# Patient Record
Sex: FEMALE | Race: WHITE | NOT HISPANIC OR LATINO | URBAN - METROPOLITAN AREA
[De-identification: names, ages, dates, MRNs, and addresses within clinical notes are randomized per-mention and may not be internally consistent; named-entity substitution may affect disease eponyms.]

---

## 2018-06-26 ENCOUNTER — EMERGENCY (EMERGENCY)
Facility: HOSPITAL | Age: 33
LOS: 1 days | Discharge: HOME | End: 2018-06-26
Attending: EMERGENCY MEDICINE | Admitting: EMERGENCY MEDICINE
Payer: COMMERCIAL

## 2018-06-26 VITALS
SYSTOLIC BLOOD PRESSURE: 102 MMHG | DIASTOLIC BLOOD PRESSURE: 60 MMHG | RESPIRATION RATE: 16 BRPM | TEMPERATURE: 98 F | HEART RATE: 66 BPM | OXYGEN SATURATION: 100 %

## 2018-06-26 VITALS
HEART RATE: 62 BPM | DIASTOLIC BLOOD PRESSURE: 78 MMHG | SYSTOLIC BLOOD PRESSURE: 113 MMHG | WEIGHT: 139.99 LBS | OXYGEN SATURATION: 100 % | RESPIRATION RATE: 16 BRPM | TEMPERATURE: 97 F

## 2018-06-26 DIAGNOSIS — R55 SYNCOPE AND COLLAPSE: ICD-10-CM

## 2018-06-26 DIAGNOSIS — E87.5 HYPERKALEMIA: ICD-10-CM

## 2018-06-26 DIAGNOSIS — E87.1 HYPO-OSMOLALITY AND HYPONATREMIA: ICD-10-CM

## 2018-06-26 LAB
ALBUMIN SERPL ELPH-MCNC: 3.3 G/DL — LOW (ref 3.4–5)
ALBUMIN SERPL ELPH-MCNC: 4 G/DL — SIGNIFICANT CHANGE UP (ref 3.4–5)
ALP SERPL-CCNC: 44 U/L — SIGNIFICANT CHANGE UP (ref 40–120)
ALP SERPL-CCNC: 52 U/L — SIGNIFICANT CHANGE UP (ref 40–120)
ALT FLD-CCNC: 20 U/L — SIGNIFICANT CHANGE UP (ref 12–42)
ALT FLD-CCNC: 23 U/L — SIGNIFICANT CHANGE UP (ref 12–42)
ANION GAP SERPL CALC-SCNC: 2 MMOL/L — LOW (ref 9–16)
ANION GAP SERPL CALC-SCNC: 8 MMOL/L — LOW (ref 9–16)
APPEARANCE UR: CLEAR — SIGNIFICANT CHANGE UP
AST SERPL-CCNC: 31 U/L — SIGNIFICANT CHANGE UP (ref 15–37)
AST SERPL-CCNC: 38 U/L — HIGH (ref 15–37)
BILIRUB SERPL-MCNC: 0.5 MG/DL — SIGNIFICANT CHANGE UP (ref 0.2–1.2)
BILIRUB SERPL-MCNC: 0.7 MG/DL — SIGNIFICANT CHANGE UP (ref 0.2–1.2)
BILIRUB UR-MCNC: NEGATIVE — SIGNIFICANT CHANGE UP
BUN SERPL-MCNC: 10 MG/DL — SIGNIFICANT CHANGE UP (ref 7–23)
BUN SERPL-MCNC: 14 MG/DL — SIGNIFICANT CHANGE UP (ref 7–23)
CALCIUM SERPL-MCNC: 7.8 MG/DL — LOW (ref 8.5–10.5)
CALCIUM SERPL-MCNC: 9.1 MG/DL — SIGNIFICANT CHANGE UP (ref 8.5–10.5)
CHLORIDE SERPL-SCNC: 103 MMOL/L — SIGNIFICANT CHANGE UP (ref 96–108)
CHLORIDE SERPL-SCNC: 109 MMOL/L — HIGH (ref 96–108)
CO2 SERPL-SCNC: 22 MMOL/L — SIGNIFICANT CHANGE UP (ref 22–31)
CO2 SERPL-SCNC: 23 MMOL/L — SIGNIFICANT CHANGE UP (ref 22–31)
COLOR SPEC: YELLOW — SIGNIFICANT CHANGE UP
CREAT SERPL-MCNC: 0.62 MG/DL — SIGNIFICANT CHANGE UP (ref 0.5–1.3)
CREAT SERPL-MCNC: 0.64 MG/DL — SIGNIFICANT CHANGE UP (ref 0.5–1.3)
DIFF PNL FLD: ABNORMAL
GLUCOSE SERPL-MCNC: 107 MG/DL — HIGH (ref 70–99)
GLUCOSE SERPL-MCNC: 99 MG/DL — SIGNIFICANT CHANGE UP (ref 70–99)
GLUCOSE UR QL: NEGATIVE — SIGNIFICANT CHANGE UP
HCG UR QL: NEGATIVE — SIGNIFICANT CHANGE UP
HCT VFR BLD CALC: 35.1 % — SIGNIFICANT CHANGE UP (ref 34.5–45)
HCT VFR BLD CALC: 41.4 % — SIGNIFICANT CHANGE UP (ref 34.5–45)
HGB BLD-MCNC: 12 G/DL — SIGNIFICANT CHANGE UP (ref 11.5–15.5)
HGB BLD-MCNC: 14.2 G/DL — SIGNIFICANT CHANGE UP (ref 11.5–15.5)
KETONES UR-MCNC: 15 MG/DL
LACTATE SERPL-SCNC: 1.8 MMOL/L — SIGNIFICANT CHANGE UP (ref 0.4–2)
LEUKOCYTE ESTERASE UR-ACNC: ABNORMAL
MAGNESIUM SERPL-MCNC: 2 MG/DL — SIGNIFICANT CHANGE UP (ref 1.6–2.6)
MCHC RBC-ENTMCNC: 32.5 PG — SIGNIFICANT CHANGE UP (ref 27–34)
MCHC RBC-ENTMCNC: 32.5 PG — SIGNIFICANT CHANGE UP (ref 27–34)
MCHC RBC-ENTMCNC: 34.2 G/DL — SIGNIFICANT CHANGE UP (ref 32–36)
MCHC RBC-ENTMCNC: 34.3 G/DL — SIGNIFICANT CHANGE UP (ref 32–36)
MCV RBC AUTO: 94.7 FL — SIGNIFICANT CHANGE UP (ref 80–100)
MCV RBC AUTO: 95.1 FL — SIGNIFICANT CHANGE UP (ref 80–100)
NITRITE UR-MCNC: NEGATIVE — SIGNIFICANT CHANGE UP
PH UR: 7.5 — SIGNIFICANT CHANGE UP (ref 5–8)
PLATELET # BLD AUTO: 292 K/UL — SIGNIFICANT CHANGE UP (ref 150–400)
PLATELET # BLD AUTO: 319 K/UL — SIGNIFICANT CHANGE UP (ref 150–400)
POTASSIUM SERPL-MCNC: 4.6 MMOL/L — SIGNIFICANT CHANGE UP (ref 3.5–5.3)
POTASSIUM SERPL-MCNC: 5.6 MMOL/L — HIGH (ref 3.5–5.3)
POTASSIUM SERPL-SCNC: 4.6 MMOL/L — SIGNIFICANT CHANGE UP (ref 3.5–5.3)
POTASSIUM SERPL-SCNC: 5.6 MMOL/L — HIGH (ref 3.5–5.3)
PROT SERPL-MCNC: 6.8 G/DL — SIGNIFICANT CHANGE UP (ref 6.4–8.2)
PROT SERPL-MCNC: 8.6 G/DL — HIGH (ref 6.4–8.2)
PROT UR-MCNC: NEGATIVE MG/DL — SIGNIFICANT CHANGE UP
RBC # BLD: 3.69 M/UL — LOW (ref 3.8–5.2)
RBC # BLD: 4.37 M/UL — SIGNIFICANT CHANGE UP (ref 3.8–5.2)
RBC # FLD: 11.8 % — SIGNIFICANT CHANGE UP (ref 10.3–16.9)
RBC # FLD: 11.8 % — SIGNIFICANT CHANGE UP (ref 10.3–16.9)
SODIUM SERPL-SCNC: 128 MMOL/L — LOW (ref 132–145)
SODIUM SERPL-SCNC: 139 MMOL/L — SIGNIFICANT CHANGE UP (ref 132–145)
SP GR SPEC: 1.01 — SIGNIFICANT CHANGE UP (ref 1–1.03)
TROPONIN I SERPL-MCNC: <0.017 NG/ML — LOW (ref 0.02–0.06)
UROBILINOGEN FLD QL: 0.2 E.U./DL — SIGNIFICANT CHANGE UP
WBC # BLD: 13.5 K/UL — HIGH (ref 3.8–10.5)
WBC # BLD: 18.5 K/UL — HIGH (ref 3.8–10.5)
WBC # FLD AUTO: 13.5 K/UL — HIGH (ref 3.8–10.5)
WBC # FLD AUTO: 18.5 K/UL — HIGH (ref 3.8–10.5)

## 2018-06-26 PROCEDURE — 71046 X-RAY EXAM CHEST 2 VIEWS: CPT | Mod: 26

## 2018-06-26 PROCEDURE — 99220: CPT | Mod: 25

## 2018-06-26 PROCEDURE — 93010 ELECTROCARDIOGRAM REPORT: CPT

## 2018-06-26 PROCEDURE — 76830 TRANSVAGINAL US NON-OB: CPT | Mod: 26

## 2018-06-26 RX ORDER — KETOROLAC TROMETHAMINE 30 MG/ML
30 SYRINGE (ML) INJECTION ONCE
Qty: 0 | Refills: 0 | Status: DISCONTINUED | OUTPATIENT
Start: 2018-06-26 | End: 2018-06-26

## 2018-06-26 RX ORDER — SODIUM CHLORIDE 9 MG/ML
1000 INJECTION INTRAMUSCULAR; INTRAVENOUS; SUBCUTANEOUS ONCE
Qty: 0 | Refills: 0 | Status: COMPLETED | OUTPATIENT
Start: 2018-06-26 | End: 2018-06-26

## 2018-06-26 RX ORDER — FAMOTIDINE 10 MG/ML
20 INJECTION INTRAVENOUS ONCE
Qty: 0 | Refills: 0 | Status: COMPLETED | OUTPATIENT
Start: 2018-06-26 | End: 2018-06-26

## 2018-06-26 RX ORDER — ONDANSETRON 8 MG/1
4 TABLET, FILM COATED ORAL ONCE
Qty: 0 | Refills: 0 | Status: COMPLETED | OUTPATIENT
Start: 2018-06-26 | End: 2018-06-26

## 2018-06-26 RX ADMIN — ONDANSETRON 4 MILLIGRAM(S): 8 TABLET, FILM COATED ORAL at 12:57

## 2018-06-26 RX ADMIN — FAMOTIDINE 20 MILLIGRAM(S): 10 INJECTION INTRAVENOUS at 12:57

## 2018-06-26 RX ADMIN — SODIUM CHLORIDE 1000 MILLILITER(S): 9 INJECTION INTRAMUSCULAR; INTRAVENOUS; SUBCUTANEOUS at 14:10

## 2018-06-26 RX ADMIN — SODIUM CHLORIDE 1000 MILLILITER(S): 9 INJECTION INTRAMUSCULAR; INTRAVENOUS; SUBCUTANEOUS at 16:40

## 2018-06-26 RX ADMIN — SODIUM CHLORIDE 2000 MILLILITER(S): 9 INJECTION INTRAMUSCULAR; INTRAVENOUS; SUBCUTANEOUS at 15:04

## 2018-06-26 RX ADMIN — SODIUM CHLORIDE 2000 MILLILITER(S): 9 INJECTION INTRAMUSCULAR; INTRAVENOUS; SUBCUTANEOUS at 12:58

## 2018-06-26 NOTE — ED CDU PROVIDER DISPOSITION NOTE - CLINICAL COURSE
pt with generalized weakness, feeling unwell, N/V and abdominal cramps, first day of her menses, noted some pelvic discomfort as well, labs with electrolyte abnormalities, hypoNa and hyperK, no EKG changes, will keep in obs for IV hydration, electrolyte correction and monitoring, pelvic US to r/o ovarian cyst/torsion/rupture cyst, US wnl, sx markedly improved s/p IVF, electrolytes normalized, AFVSS at time of d/c, pt non-toxic appearing, results, ddx, and f/u plans discussed with pt at bedside, d/c'd home to f/u with PMD, strict return precautions discussed, prompt return to ER for any worsening or new sx, pt verbalized understanding.

## 2018-06-26 NOTE — ED PROVIDER NOTE - DIAGNOSTIC INTERPRETATION
Xray (wet reads) interpreted by PERCY SCHREIBER   CXR - Cardiac silhouette, aortic knob, mediastinal and hilar contours appear wnl, no acute consolidation, infiltrate, effusion, or PTX. No bony abnormalities noted

## 2018-06-26 NOTE — ED CDU PROVIDER INITIAL DAY NOTE - OBJECTIVE STATEMENT
31 yo F with no known PMHx, presenting c/o syncope and N/V. Pt reports feeling lightheadedness with nausea and near syncope while she was working out at [facility] [employer] [facility]  Pt tried to rest it out and walk back home but noted increased dizziness and had brief LOC when she laid herself down on the bench.  Admits to having sudden cramps in the lower abdominal region and just started her menstrual period today.  Had two episodes of NBNB emesis subsequently.  Denies HA, numbness, tingling, photophobia, diplopia, change in vision/hearing/gait/speech, focal weakness, neck pain, rash, fever, chills, stiffness, CP, SOB, palpitations, diaphoresis, D/C, change in urinary/bowel function, dysuria, hematuria, flank pain, and malaise.  No recent international travel or sick contact noted

## 2018-06-26 NOTE — ED CDU PROVIDER DISPOSITION NOTE - ATTENDING CONTRIBUTION TO CARE
We have discussed the discharge plan with the patient. The patient agrees with the plan, as discussed.  The patient understands Emergency Department diagnosis is a preliminary diagnosis often based on limited information and that the patient must adhere to the follow-up plan as discussed.  The patient understands that if the symptoms worsen she may return to the Emergency Department at any time for further evaluation and treatment.

## 2018-06-26 NOTE — ED PROVIDER NOTE - OBJECTIVE STATEMENT
33 yo F 33 yo F with no known PMHx, presenting c/o syncope and N/V. Pt reports feeling lightheadedness with nausea and near syncope while she was working [facility] at [facility] [employer] [facility].  Pt tried to rest it out and walk back home but noted increased dizziness and had brief LOC when she laid herself down on the bench.  Admits to having sudden cramps in the lower abdominal region and just started her menstrual period today.  Had two episodes of NBNB emesis subsequently.  Denies HA, numbness, tingling, photophobia, diplopia, change in vision/hearing/gait/speech, focal weakness, neck pain, rash, fever, chills, stiffness, CP, SOB, palpitations, diaphoresis, D/C, change in urinary/bowel function, dysuria, hematuria, flank pain, and malaise.  No recent international travel or sick contact noted

## 2018-06-26 NOTE — ED ADULT TRIAGE NOTE - CHIEF COMPLAINT QUOTE
had a near syncope episode with a couple of episode of vomiting - pt is a+Ox3 denies LOC, chest pain, dizziness

## 2018-06-26 NOTE — ED PROVIDER NOTE - PHYSICAL EXAMINATION
Vital Signs - nursing notes reviewed and confirmed  Gen - WDWN, NAD, comfortable and non-toxic appearing, speaking in full sentences   Skin - warm, dry, intact  HEENT - AT/NC, PERRL, EOMI, no conjunctival injection, moist oral mucosa, o/p clear with no erythema, edema, or exudate, uvula midline, airway patent, neck supple and NT, FROM, no JVD or carotid bruits b/l, no palpable nodes  CV - S1S2, R/R/R  Resp - respiration non-labored, CTAB, symmetric bs b/l, no r/r/w  GI - NABS, soft, ND, mild b/l lower abdominal discomfort on deep palpation, no rebound or guarding, no CVAT b/l   pelvic exam - external genitalia wnl, no rash, vesicles, erythema, edema, or laceration, +bleeding in vault due to menses, no foul smelling d/c in vault, no CMT or adnexal tenderness b/l, cervical os appears closed.    MS - w/w/p, no c/c/e, calves supple and NT, distal pulses symmetric b/l   Neuro - AxOx3, no focal neuro deficits, CN II-XII grossly intact, cerebellar function intact, negative pronator drift, negative nystagmus, ambulatory without gait disturbance

## 2018-06-26 NOTE — ED PROVIDER NOTE - ATTENDING CONTRIBUTION TO CARE
Pt is a 31yo F with no PMH who p/w syncope, nausea, and vomiting.  Episode associated with sudden onset of abd cramping.  Followed by vomiting x 2.     PE - agree with PA exam as above.   A/P - Syncope and abd cramping.  Syncope likely vagal due to pain vs dehydration.   Labs concerning for electrolyte deficiencies.  Will place on observation for hydration and repletion.

## 2018-06-26 NOTE — ED PROVIDER NOTE - CARE PLAN
Principal Discharge DX:	Syncope  Secondary Diagnosis:	Hyponatremia Principal Discharge DX:	Syncope  Secondary Diagnosis:	Hyponatremia  Secondary Diagnosis:	Hyperkalemia

## 2018-06-26 NOTE — ED CDU PROVIDER INITIAL DAY NOTE - ATTENDING CONTRIBUTION TO CARE
Electrolyte abnormalities and syncope likely due to combination of abd cramping from onset of menstruation and dehydration.  On observation for hydration.  Pt's labs improve as do her sxs.  stable for discharge.

## 2018-06-26 NOTE — ED PROVIDER NOTE - MEDICAL DECISION MAKING DETAILS
pt with generalized weakness, feeling unwell, N/V and abdominal cramps, first day of her menses, noted some pelvic discomfort as well, labs with electrolyte abnormalities, hypoNa and hyperK, no EKG changes, will keep in obs for IV hydration, electrolyte correction and monitoring

## 2018-06-26 NOTE — ED CDU PROVIDER INITIAL DAY NOTE - PROGRESS NOTE DETAILS
pt with generalized weakness, feeling unwell, N/V and abdominal cramps, first day of her menses, noted some pelvic discomfort as well, labs with electrolyte abnormalities, hypoNa and hyperK, no EKG changes, will keep in obs for IV hydration, electrolyte correction and monitoring, pelvic US to r/o ovarian cyst/torsion/rupture cyst, continue with current management reports feeling better s/p IVF and toradol, cardiac monitoring unremarkable, currently pending repeat labs and US transvaginal to r/o torsion/ruptured cyst lytes normalized, pt at baseline,  at bedside, AFVSS at time of d/c, pt non-toxic appearing, results, ddx, and f/u plans discussed with pt at bedside, d/c'd home to f/u with PMD, strict return precautions discussed, prompt return to ER for any worsening or new sx, pt verbalized understanding.

## 2019-10-11 ENCOUNTER — INPATIENT (INPATIENT)
Facility: HOSPITAL | Age: 34
LOS: 2 days | Discharge: HOME | End: 2019-10-14
Attending: OBSTETRICS & GYNECOLOGY | Admitting: OBSTETRICS & GYNECOLOGY
Payer: COMMERCIAL

## 2019-10-11 ENCOUNTER — RESULT REVIEW (OUTPATIENT)
Age: 34
End: 2019-10-11

## 2019-10-11 VITALS — WEIGHT: 175.27 LBS | HEIGHT: 64 IN

## 2019-10-11 DIAGNOSIS — O26.899 OTHER SPECIFIED PREGNANCY RELATED CONDITIONS, UNSPECIFIED TRIMESTER: ICD-10-CM

## 2019-10-11 DIAGNOSIS — Z3A.00 WEEKS OF GESTATION OF PREGNANCY NOT SPECIFIED: ICD-10-CM

## 2019-10-11 LAB
ALBUMIN SERPL ELPH-MCNC: 3.4 G/DL — SIGNIFICANT CHANGE UP (ref 3.3–5)
ALP SERPL-CCNC: 320 U/L — HIGH (ref 40–120)
ALT FLD-CCNC: 177 U/L — HIGH (ref 10–45)
ANION GAP SERPL CALC-SCNC: 12 MMOL/L — SIGNIFICANT CHANGE UP (ref 5–17)
APPEARANCE UR: CLEAR — SIGNIFICANT CHANGE UP
APTT BLD: 30.6 SEC — SIGNIFICANT CHANGE UP (ref 27.5–36.3)
AST SERPL-CCNC: 118 U/L — HIGH (ref 10–40)
BASOPHILS # BLD AUTO: 0.06 K/UL — SIGNIFICANT CHANGE UP (ref 0–0.2)
BASOPHILS NFR BLD AUTO: 0.5 % — SIGNIFICANT CHANGE UP (ref 0–2)
BILIRUB SERPL-MCNC: 0.3 MG/DL — SIGNIFICANT CHANGE UP (ref 0.2–1.2)
BILIRUB UR-MCNC: NEGATIVE — SIGNIFICANT CHANGE UP
BLD GP AB SCN SERPL QL: NEGATIVE — SIGNIFICANT CHANGE UP
BUN SERPL-MCNC: 6 MG/DL — LOW (ref 7–23)
CALCIUM SERPL-MCNC: 8.4 MG/DL — SIGNIFICANT CHANGE UP (ref 8.4–10.5)
CHLORIDE SERPL-SCNC: 104 MMOL/L — SIGNIFICANT CHANGE UP (ref 96–108)
CO2 SERPL-SCNC: 21 MMOL/L — LOW (ref 22–31)
COLOR SPEC: YELLOW — SIGNIFICANT CHANGE UP
CREAT ?TM UR-MCNC: 16 MG/DL — SIGNIFICANT CHANGE UP
CREAT SERPL-MCNC: 0.75 MG/DL — SIGNIFICANT CHANGE UP (ref 0.5–1.3)
DIFF PNL FLD: ABNORMAL
EOSINOPHIL # BLD AUTO: 0.07 K/UL — SIGNIFICANT CHANGE UP (ref 0–0.5)
EOSINOPHIL NFR BLD AUTO: 0.6 % — SIGNIFICANT CHANGE UP (ref 0–6)
FIBRINOGEN PPP-MCNC: 439 MG/DL — HIGH (ref 258–438)
GLUCOSE SERPL-MCNC: 93 MG/DL — SIGNIFICANT CHANGE UP (ref 70–99)
GLUCOSE UR QL: NEGATIVE — SIGNIFICANT CHANGE UP
HCT VFR BLD CALC: 34.3 % — LOW (ref 34.5–45)
HGB BLD-MCNC: 11.7 G/DL — SIGNIFICANT CHANGE UP (ref 11.5–15.5)
IMM GRANULOCYTES NFR BLD AUTO: 0.7 % — SIGNIFICANT CHANGE UP (ref 0–1.5)
INR BLD: 0.92 — SIGNIFICANT CHANGE UP (ref 0.88–1.16)
KETONES UR-MCNC: NEGATIVE — SIGNIFICANT CHANGE UP
LDH SERPL L TO P-CCNC: 276 U/L — HIGH (ref 50–242)
LEUKOCYTE ESTERASE UR-ACNC: ABNORMAL
LYMPHOCYTES # BLD AUTO: 2.45 K/UL — SIGNIFICANT CHANGE UP (ref 1–3.3)
LYMPHOCYTES # BLD AUTO: 20 % — SIGNIFICANT CHANGE UP (ref 13–44)
MAGNESIUM SERPL-MCNC: 6.1 MG/DL — HIGH (ref 1.6–2.6)
MAGNESIUM SERPL-MCNC: 6.2 MG/DL — HIGH (ref 1.6–2.6)
MCHC RBC-ENTMCNC: 32.1 PG — SIGNIFICANT CHANGE UP (ref 27–34)
MCHC RBC-ENTMCNC: 34.1 GM/DL — SIGNIFICANT CHANGE UP (ref 32–36)
MCV RBC AUTO: 94.2 FL — SIGNIFICANT CHANGE UP (ref 80–100)
MONOCYTES # BLD AUTO: 0.92 K/UL — HIGH (ref 0–0.9)
MONOCYTES NFR BLD AUTO: 7.5 % — SIGNIFICANT CHANGE UP (ref 2–14)
NEUTROPHILS # BLD AUTO: 8.68 K/UL — HIGH (ref 1.8–7.4)
NEUTROPHILS NFR BLD AUTO: 70.7 % — SIGNIFICANT CHANGE UP (ref 43–77)
NITRITE UR-MCNC: NEGATIVE — SIGNIFICANT CHANGE UP
NRBC # BLD: 0 /100 WBCS — SIGNIFICANT CHANGE UP (ref 0–0)
PH UR: 6.5 — SIGNIFICANT CHANGE UP (ref 5–8)
PLATELET # BLD AUTO: 243 K/UL — SIGNIFICANT CHANGE UP (ref 150–400)
POTASSIUM SERPL-MCNC: 3.7 MMOL/L — SIGNIFICANT CHANGE UP (ref 3.5–5.3)
POTASSIUM SERPL-SCNC: 3.7 MMOL/L — SIGNIFICANT CHANGE UP (ref 3.5–5.3)
PROT ?TM UR-MCNC: <4 MG/DL — SIGNIFICANT CHANGE UP (ref 0–12)
PROT SERPL-MCNC: 6.5 G/DL — SIGNIFICANT CHANGE UP (ref 6–8.3)
PROT UR-MCNC: NEGATIVE MG/DL — SIGNIFICANT CHANGE UP
PROT/CREAT UR-RTO: <0.2 RATIO — SIGNIFICANT CHANGE UP (ref 0–0.2)
PROTHROM AB SERPL-ACNC: 10.4 SEC — SIGNIFICANT CHANGE UP (ref 10–12.9)
RBC # BLD: 3.64 M/UL — LOW (ref 3.8–5.2)
RBC # FLD: 12.2 % — SIGNIFICANT CHANGE UP (ref 10.3–14.5)
RH IG SCN BLD-IMP: POSITIVE — SIGNIFICANT CHANGE UP
RH IG SCN BLD-IMP: POSITIVE — SIGNIFICANT CHANGE UP
SODIUM SERPL-SCNC: 137 MMOL/L — SIGNIFICANT CHANGE UP (ref 135–145)
SP GR SPEC: <=1.005 — SIGNIFICANT CHANGE UP (ref 1–1.03)
T PALLIDUM AB TITR SER: NEGATIVE — SIGNIFICANT CHANGE UP
URATE SERPL-MCNC: 6.2 MG/DL — SIGNIFICANT CHANGE UP (ref 2.5–7)
UROBILINOGEN FLD QL: 0.2 E.U./DL — SIGNIFICANT CHANGE UP
WBC # BLD: 12.27 K/UL — HIGH (ref 3.8–10.5)
WBC # FLD AUTO: 12.27 K/UL — HIGH (ref 3.8–10.5)

## 2019-10-11 RX ORDER — LANOLIN
1 OINTMENT (GRAM) TOPICAL EVERY 6 HOURS
Refills: 0 | Status: DISCONTINUED | OUTPATIENT
Start: 2019-10-11 | End: 2019-10-14

## 2019-10-11 RX ORDER — MAGNESIUM SULFATE 500 MG/ML
1.5 VIAL (ML) INJECTION
Qty: 40 | Refills: 0 | Status: DISCONTINUED | OUTPATIENT
Start: 2019-10-11 | End: 2019-10-12

## 2019-10-11 RX ORDER — MAGNESIUM SULFATE 500 MG/ML
2 VIAL (ML) INJECTION
Qty: 40 | Refills: 0 | Status: DISCONTINUED | OUTPATIENT
Start: 2019-10-11 | End: 2019-10-11

## 2019-10-11 RX ORDER — SODIUM CHLORIDE 9 MG/ML
1000 INJECTION, SOLUTION INTRAVENOUS
Refills: 0 | Status: DISCONTINUED | OUTPATIENT
Start: 2019-10-11 | End: 2019-10-11

## 2019-10-11 RX ORDER — GLYCERIN ADULT
1 SUPPOSITORY, RECTAL RECTAL AT BEDTIME
Refills: 0 | Status: DISCONTINUED | OUTPATIENT
Start: 2019-10-11 | End: 2019-10-14

## 2019-10-11 RX ORDER — IBUPROFEN 200 MG
600 TABLET ORAL EVERY 6 HOURS
Refills: 0 | Status: COMPLETED | OUTPATIENT
Start: 2019-10-11 | End: 2020-09-08

## 2019-10-11 RX ORDER — OXYCODONE HYDROCHLORIDE 5 MG/1
5 TABLET ORAL
Refills: 0 | Status: DISCONTINUED | OUTPATIENT
Start: 2019-10-11 | End: 2019-10-14

## 2019-10-11 RX ORDER — HYDROCORTISONE 1 %
1 OINTMENT (GRAM) TOPICAL EVERY 6 HOURS
Refills: 0 | Status: DISCONTINUED | OUTPATIENT
Start: 2019-10-11 | End: 2019-10-14

## 2019-10-11 RX ORDER — FENTANYL/BUPIVACAINE/NS/PF 2MCG/ML-.1
250 PLASTIC BAG, INJECTION (ML) INJECTION
Refills: 0 | Status: DISCONTINUED | OUTPATIENT
Start: 2019-10-11 | End: 2019-10-11

## 2019-10-11 RX ORDER — OXYTOCIN 10 UNIT/ML
2 VIAL (ML) INJECTION
Qty: 30 | Refills: 0 | Status: DISCONTINUED | OUTPATIENT
Start: 2019-10-11 | End: 2019-10-11

## 2019-10-11 RX ORDER — CITRIC ACID/SODIUM CITRATE 300-500 MG
15 SOLUTION, ORAL ORAL EVERY 6 HOURS
Refills: 0 | Status: DISCONTINUED | OUTPATIENT
Start: 2019-10-11 | End: 2019-10-11

## 2019-10-11 RX ORDER — SIMETHICONE 80 MG/1
80 TABLET, CHEWABLE ORAL EVERY 4 HOURS
Refills: 0 | Status: DISCONTINUED | OUTPATIENT
Start: 2019-10-11 | End: 2019-10-14

## 2019-10-11 RX ORDER — AER TRAVELER 0.5 G/1
1 SOLUTION RECTAL; TOPICAL EVERY 4 HOURS
Refills: 0 | Status: DISCONTINUED | OUTPATIENT
Start: 2019-10-11 | End: 2019-10-14

## 2019-10-11 RX ORDER — BENZOCAINE 10 %
1 GEL (GRAM) MUCOUS MEMBRANE EVERY 6 HOURS
Refills: 0 | Status: DISCONTINUED | OUTPATIENT
Start: 2019-10-11 | End: 2019-10-14

## 2019-10-11 RX ORDER — DIBUCAINE 1 %
1 OINTMENT (GRAM) RECTAL EVERY 6 HOURS
Refills: 0 | Status: DISCONTINUED | OUTPATIENT
Start: 2019-10-11 | End: 2019-10-14

## 2019-10-11 RX ORDER — OXYTOCIN 10 UNIT/ML
333.33 VIAL (ML) INJECTION
Qty: 20 | Refills: 0 | Status: DISCONTINUED | OUTPATIENT
Start: 2019-10-11 | End: 2019-10-14

## 2019-10-11 RX ORDER — KETOROLAC TROMETHAMINE 30 MG/ML
30 SYRINGE (ML) INJECTION ONCE
Refills: 0 | Status: DISCONTINUED | OUTPATIENT
Start: 2019-10-11 | End: 2019-10-12

## 2019-10-11 RX ORDER — OXYTOCIN 10 UNIT/ML
1 VIAL (ML) INJECTION
Qty: 30 | Refills: 0 | Status: DISCONTINUED | OUTPATIENT
Start: 2019-10-11 | End: 2019-10-11

## 2019-10-11 RX ORDER — TETANUS TOXOID, REDUCED DIPHTHERIA TOXOID AND ACELLULAR PERTUSSIS VACCINE, ADSORBED 5; 2.5; 8; 8; 2.5 [IU]/.5ML; [IU]/.5ML; UG/.5ML; UG/.5ML; UG/.5ML
0.5 SUSPENSION INTRAMUSCULAR ONCE
Refills: 0 | Status: DISCONTINUED | OUTPATIENT
Start: 2019-10-11 | End: 2019-10-14

## 2019-10-11 RX ORDER — DIPHENHYDRAMINE HCL 50 MG
25 CAPSULE ORAL EVERY 6 HOURS
Refills: 0 | Status: DISCONTINUED | OUTPATIENT
Start: 2019-10-11 | End: 2019-10-14

## 2019-10-11 RX ORDER — PRAMOXINE HYDROCHLORIDE 150 MG/15G
1 AEROSOL, FOAM RECTAL EVERY 4 HOURS
Refills: 0 | Status: DISCONTINUED | OUTPATIENT
Start: 2019-10-11 | End: 2019-10-14

## 2019-10-11 RX ORDER — MAGNESIUM HYDROXIDE 400 MG/1
30 TABLET, CHEWABLE ORAL
Refills: 0 | Status: DISCONTINUED | OUTPATIENT
Start: 2019-10-11 | End: 2019-10-14

## 2019-10-11 RX ORDER — SODIUM CHLORIDE 9 MG/ML
3 INJECTION INTRAMUSCULAR; INTRAVENOUS; SUBCUTANEOUS EVERY 8 HOURS
Refills: 0 | Status: DISCONTINUED | OUTPATIENT
Start: 2019-10-11 | End: 2019-10-14

## 2019-10-11 RX ORDER — ACETAMINOPHEN 500 MG
975 TABLET ORAL
Refills: 0 | Status: DISCONTINUED | OUTPATIENT
Start: 2019-10-11 | End: 2019-10-14

## 2019-10-11 RX ORDER — DOCUSATE SODIUM 100 MG
100 CAPSULE ORAL
Refills: 0 | Status: DISCONTINUED | OUTPATIENT
Start: 2019-10-11 | End: 2019-10-14

## 2019-10-11 RX ORDER — OXYCODONE HYDROCHLORIDE 5 MG/1
5 TABLET ORAL ONCE
Refills: 0 | Status: DISCONTINUED | OUTPATIENT
Start: 2019-10-11 | End: 2019-10-14

## 2019-10-11 RX ORDER — OXYTOCIN 10 UNIT/ML
333.33 VIAL (ML) INJECTION
Qty: 20 | Refills: 0 | Status: DISCONTINUED | OUTPATIENT
Start: 2019-10-11 | End: 2019-10-11

## 2019-10-11 RX ORDER — ACETAMINOPHEN 500 MG
975 TABLET ORAL ONCE
Refills: 0 | Status: COMPLETED | OUTPATIENT
Start: 2019-10-11 | End: 2019-10-11

## 2019-10-11 RX ORDER — MAGNESIUM SULFATE 500 MG/ML
4 VIAL (ML) INJECTION ONCE
Refills: 0 | Status: COMPLETED | OUTPATIENT
Start: 2019-10-11 | End: 2019-10-11

## 2019-10-11 RX ADMIN — Medication 1 MILLIUNIT(S)/MIN: at 16:18

## 2019-10-11 RX ADMIN — Medication 250 MILLILITER(S): at 15:00

## 2019-10-11 RX ADMIN — SODIUM CHLORIDE 75 MILLILITER(S): 9 INJECTION, SOLUTION INTRAVENOUS at 11:00

## 2019-10-11 RX ADMIN — Medication 1 MILLIUNIT(S)/MIN: at 09:25

## 2019-10-11 RX ADMIN — Medication 975 MILLIGRAM(S): at 16:10

## 2019-10-11 RX ADMIN — SODIUM CHLORIDE 125 MILLILITER(S): 9 INJECTION, SOLUTION INTRAVENOUS at 07:02

## 2019-10-11 RX ADMIN — Medication 50 GM/HR: at 09:18

## 2019-10-11 RX ADMIN — Medication 975 MILLIGRAM(S): at 16:40

## 2019-10-11 RX ADMIN — Medication 37.5 GM/HR: at 22:02

## 2019-10-11 RX ADMIN — Medication 200 GRAM(S): at 08:57

## 2019-10-11 RX ADMIN — Medication 37.5 GM/HR: at 22:01

## 2019-10-11 RX ADMIN — Medication 1000 MILLIUNIT(S)/MIN: at 23:48

## 2019-10-11 RX ADMIN — SODIUM CHLORIDE 87.5 MILLILITER(S): 9 INJECTION, SOLUTION INTRAVENOUS at 22:02

## 2019-10-12 LAB
ALBUMIN SERPL ELPH-MCNC: 2.2 G/DL — LOW (ref 3.3–5)
ALP SERPL-CCNC: 216 U/L — HIGH (ref 40–120)
ALT FLD-CCNC: 131 U/L — HIGH (ref 10–45)
ANION GAP SERPL CALC-SCNC: 9 MMOL/L — SIGNIFICANT CHANGE UP (ref 5–17)
AST SERPL-CCNC: 94 U/L — HIGH (ref 10–40)
BILIRUB SERPL-MCNC: 0.2 MG/DL — SIGNIFICANT CHANGE UP (ref 0.2–1.2)
BUN SERPL-MCNC: 7 MG/DL — SIGNIFICANT CHANGE UP (ref 7–23)
CALCIUM SERPL-MCNC: 6.9 MG/DL — LOW (ref 8.4–10.5)
CHLORIDE SERPL-SCNC: 102 MMOL/L — SIGNIFICANT CHANGE UP (ref 96–108)
CO2 SERPL-SCNC: 21 MMOL/L — LOW (ref 22–31)
CREAT SERPL-MCNC: 0.85 MG/DL — SIGNIFICANT CHANGE UP (ref 0.5–1.3)
GLUCOSE SERPL-MCNC: 112 MG/DL — HIGH (ref 70–99)
HCT VFR BLD CALC: 24.7 % — LOW (ref 34.5–45)
HGB BLD-MCNC: 8.6 G/DL — LOW (ref 11.5–15.5)
MAGNESIUM SERPL-MCNC: 5.6 MG/DL — HIGH (ref 1.6–2.6)
MAGNESIUM SERPL-MCNC: 5.9 MG/DL — HIGH (ref 1.6–2.6)
MAGNESIUM SERPL-MCNC: 6.2 MG/DL — HIGH (ref 1.6–2.6)
MCHC RBC-ENTMCNC: 32.8 PG — SIGNIFICANT CHANGE UP (ref 27–34)
MCHC RBC-ENTMCNC: 34.8 GM/DL — SIGNIFICANT CHANGE UP (ref 32–36)
MCV RBC AUTO: 94.3 FL — SIGNIFICANT CHANGE UP (ref 80–100)
NRBC # BLD: 0 /100 WBCS — SIGNIFICANT CHANGE UP (ref 0–0)
PLATELET # BLD AUTO: 218 K/UL — SIGNIFICANT CHANGE UP (ref 150–400)
POTASSIUM SERPL-MCNC: 3.4 MMOL/L — LOW (ref 3.5–5.3)
POTASSIUM SERPL-SCNC: 3.4 MMOL/L — LOW (ref 3.5–5.3)
PROT SERPL-MCNC: 4.7 G/DL — LOW (ref 6–8.3)
RBC # BLD: 2.62 M/UL — LOW (ref 3.8–5.2)
RBC # FLD: 12.3 % — SIGNIFICANT CHANGE UP (ref 10.3–14.5)
SODIUM SERPL-SCNC: 132 MMOL/L — LOW (ref 135–145)
WBC # BLD: 21.3 K/UL — HIGH (ref 3.8–10.5)
WBC # FLD AUTO: 21.3 K/UL — HIGH (ref 3.8–10.5)

## 2019-10-12 RX ORDER — OXYTOCIN 10 UNIT/ML
20 VIAL (ML) INJECTION ONCE
Refills: 0 | Status: COMPLETED | OUTPATIENT
Start: 2019-10-12 | End: 2019-10-12

## 2019-10-12 RX ORDER — SODIUM CHLORIDE 9 MG/ML
1000 INJECTION INTRAMUSCULAR; INTRAVENOUS; SUBCUTANEOUS
Refills: 0 | Status: DISCONTINUED | OUTPATIENT
Start: 2019-10-12 | End: 2019-10-13

## 2019-10-12 RX ORDER — SODIUM CHLORIDE 9 MG/ML
1000 INJECTION, SOLUTION INTRAVENOUS
Refills: 0 | Status: DISCONTINUED | OUTPATIENT
Start: 2019-10-12 | End: 2019-10-12

## 2019-10-12 RX ORDER — IBUPROFEN 200 MG
600 TABLET ORAL EVERY 6 HOURS
Refills: 0 | Status: DISCONTINUED | OUTPATIENT
Start: 2019-10-12 | End: 2019-10-14

## 2019-10-12 RX ORDER — MAGNESIUM SULFATE 500 MG/ML
1.5 VIAL (ML) INJECTION
Qty: 40 | Refills: 0 | Status: DISCONTINUED | OUTPATIENT
Start: 2019-10-12 | End: 2019-10-12

## 2019-10-12 RX ORDER — MAGNESIUM SULFATE 500 MG/ML
1 VIAL (ML) INJECTION
Qty: 40 | Refills: 0 | Status: DISCONTINUED | OUTPATIENT
Start: 2019-10-12 | End: 2019-10-13

## 2019-10-12 RX ADMIN — Medication 1 APPLICATION(S): at 09:43

## 2019-10-12 RX ADMIN — Medication 975 MILLIGRAM(S): at 03:00

## 2019-10-12 RX ADMIN — Medication 600 MILLIGRAM(S): at 13:05

## 2019-10-12 RX ADMIN — SODIUM CHLORIDE 3 MILLILITER(S): 9 INJECTION INTRAMUSCULAR; INTRAVENOUS; SUBCUTANEOUS at 06:13

## 2019-10-12 RX ADMIN — SODIUM CHLORIDE 3 MILLILITER(S): 9 INJECTION INTRAMUSCULAR; INTRAVENOUS; SUBCUTANEOUS at 21:11

## 2019-10-12 RX ADMIN — Medication 975 MILLIGRAM(S): at 09:42

## 2019-10-12 RX ADMIN — Medication 600 MILLIGRAM(S): at 14:05

## 2019-10-12 RX ADMIN — Medication 20 UNIT(S): at 00:45

## 2019-10-12 RX ADMIN — Medication 975 MILLIGRAM(S): at 21:20

## 2019-10-12 RX ADMIN — Medication 30 MILLIGRAM(S): at 01:29

## 2019-10-12 RX ADMIN — Medication 1 TABLET(S): at 13:05

## 2019-10-12 RX ADMIN — SODIUM CHLORIDE 100 MILLILITER(S): 9 INJECTION INTRAMUSCULAR; INTRAVENOUS; SUBCUTANEOUS at 19:40

## 2019-10-12 RX ADMIN — Medication 600 MILLIGRAM(S): at 07:40

## 2019-10-12 RX ADMIN — SODIUM CHLORIDE 87.5 MILLILITER(S): 9 INJECTION, SOLUTION INTRAVENOUS at 07:29

## 2019-10-12 RX ADMIN — Medication 37.5 GM/HR: at 07:29

## 2019-10-12 RX ADMIN — Medication 975 MILLIGRAM(S): at 16:45

## 2019-10-12 RX ADMIN — Medication 975 MILLIGRAM(S): at 10:45

## 2019-10-12 RX ADMIN — Medication 1 SPRAY(S): at 09:44

## 2019-10-12 RX ADMIN — SODIUM CHLORIDE 3 MILLILITER(S): 9 INJECTION INTRAMUSCULAR; INTRAVENOUS; SUBCUTANEOUS at 14:01

## 2019-10-12 RX ADMIN — Medication 975 MILLIGRAM(S): at 15:55

## 2019-10-12 RX ADMIN — Medication 100 MILLIGRAM(S): at 13:05

## 2019-10-12 RX ADMIN — Medication 600 MILLIGRAM(S): at 19:25

## 2019-10-12 RX ADMIN — Medication 975 MILLIGRAM(S): at 03:43

## 2019-10-12 RX ADMIN — Medication 30 MILLIGRAM(S): at 01:07

## 2019-10-12 RX ADMIN — Medication 600 MILLIGRAM(S): at 20:15

## 2019-10-12 RX ADMIN — AER TRAVELER 1 APPLICATION(S): 0.5 SOLUTION RECTAL; TOPICAL at 09:42

## 2019-10-12 RX ADMIN — Medication 100 MILLIGRAM(S): at 09:43

## 2019-10-12 RX ADMIN — Medication 25 GM/HR: at 11:43

## 2019-10-12 NOTE — PROGRESS NOTE ADULT - ASSESSMENT
A&P:   34y  s/p , PP1 complicated by preeclampsia with severe features      1. Preeclampsia: Continue IV Magnesium @2G/hr for 24 hrs post delivery.  No complaints currently.   Antihypertensives:   Continue to monitor blood pressures  Next Magnesium check @ 15:30  Follow up on Magnesium serum level     2. GI: Clears, until Magnesium is stopped    3. : strict Is and Os, D/C tripp after discontinuation of IV Magnesium

## 2019-10-12 NOTE — PROGRESS NOTE ADULT - ASSESSMENT
A&P:   34y  s/p , PP1 complicated by preeclampsia with severe features    1. Preeclampsia: Continue IV Magnesium @1.5G/hr for 24 hrs post delivery.  1/10 headache, will give tylenol and reassess in 1hr  Antihypertensives: none  Continue to monitor blood pressures  Next Magnesium check @ 21:30  Follow up on Magnesium serum level     2. GI: Clears, until Magnesium is stopped    3. : strict Is and Os, D/C tripp after discontinuation of IV Magnesium A&P:   34y  s/p , PP1 complicated by preeclampsia with severe features    1. Preeclampsia: Continue IV Magnesium @1.5G/hr for 24 hrs post delivery.  1/10 headache, will give tylenol and reassess in 1hr  Antihypertensives: none  Continue to monitor blood pressures  Next Magnesium check @ 21:30  Follow up on Magnesium serum level     2. GI: reegular diet    3. : strict Is and Os, D/C tripp after discontinuation of IV Magnesium

## 2019-10-12 NOTE — PROGRESS NOTE ADULT - ASSESSMENT
A&P:  34y  s/p   complicated by preeclampsia with severe features (HA, floaters, LFTs)  Currently complaining of blurry vision that might be attributed to the magnesium treatment or to prolonged pushing during labor (5 hours).  No severe range BP.  Antihypertensives: Non at this time.   Mg level is 5.9    - Continue IV Magnesium @2G/hr untill  10/13  00:00  - Continue to monitor blood pressures  - Follow up on Magnesium serum levels. Next Magnesium check @ 9:30 with full labs  - GI: Clears, until Magnesium is stopped  - : strict Is and Os, D/C tripp after discontinuation of IV Magnesium

## 2019-10-12 NOTE — PROGRESS NOTE ADULT - ASSESSMENT
A&P:   34y  s/p  complicated by preeclampsia with severe features      1. Preeclampsia: Continue IV Magnesium @2G/hr for 24 hrs post delivery.  No complaints currently.   Antihypertensives: none  Continue to monitor blood pressures  DC magnesium at 00:00  Follow up on Magnesium serum level     2. GI: full diet    3. : strict Is and Os, D/C tripp after discontinuation of IV Magnesium

## 2019-10-13 LAB
ALBUMIN SERPL ELPH-MCNC: 2.2 G/DL — LOW (ref 3.3–5)
ALP SERPL-CCNC: 198 U/L — HIGH (ref 40–120)
ALT FLD-CCNC: 97 U/L — HIGH (ref 10–45)
ANION GAP SERPL CALC-SCNC: 6 MMOL/L — SIGNIFICANT CHANGE UP (ref 5–17)
AST SERPL-CCNC: 62 U/L — HIGH (ref 10–40)
BILIRUB SERPL-MCNC: <0.2 MG/DL — SIGNIFICANT CHANGE UP (ref 0.2–1.2)
BUN SERPL-MCNC: 7 MG/DL — SIGNIFICANT CHANGE UP (ref 7–23)
CALCIUM SERPL-MCNC: 6.8 MG/DL — LOW (ref 8.4–10.5)
CHLORIDE SERPL-SCNC: 108 MMOL/L — SIGNIFICANT CHANGE UP (ref 96–108)
CO2 SERPL-SCNC: 22 MMOL/L — SIGNIFICANT CHANGE UP (ref 22–31)
CREAT SERPL-MCNC: 0.83 MG/DL — SIGNIFICANT CHANGE UP (ref 0.5–1.3)
GLUCOSE SERPL-MCNC: 82 MG/DL — SIGNIFICANT CHANGE UP (ref 70–99)
HCT VFR BLD CALC: 24 % — LOW (ref 34.5–45)
HGB BLD-MCNC: 8.1 G/DL — LOW (ref 11.5–15.5)
MCHC RBC-ENTMCNC: 32.4 PG — SIGNIFICANT CHANGE UP (ref 27–34)
MCHC RBC-ENTMCNC: 33.8 GM/DL — SIGNIFICANT CHANGE UP (ref 32–36)
MCV RBC AUTO: 96 FL — SIGNIFICANT CHANGE UP (ref 80–100)
NRBC # BLD: 0 /100 WBCS — SIGNIFICANT CHANGE UP (ref 0–0)
PLATELET # BLD AUTO: 228 K/UL — SIGNIFICANT CHANGE UP (ref 150–400)
POTASSIUM SERPL-MCNC: 3.8 MMOL/L — SIGNIFICANT CHANGE UP (ref 3.5–5.3)
POTASSIUM SERPL-SCNC: 3.8 MMOL/L — SIGNIFICANT CHANGE UP (ref 3.5–5.3)
PROT SERPL-MCNC: 4.8 G/DL — LOW (ref 6–8.3)
RBC # BLD: 2.5 M/UL — LOW (ref 3.8–5.2)
RBC # FLD: 12.7 % — SIGNIFICANT CHANGE UP (ref 10.3–14.5)
SODIUM SERPL-SCNC: 136 MMOL/L — SIGNIFICANT CHANGE UP (ref 135–145)
WBC # BLD: 14.04 K/UL — HIGH (ref 3.8–10.5)
WBC # FLD AUTO: 14.04 K/UL — HIGH (ref 3.8–10.5)

## 2019-10-13 RX ADMIN — Medication 100 MILLIGRAM(S): at 22:20

## 2019-10-13 RX ADMIN — Medication 600 MILLIGRAM(S): at 00:05

## 2019-10-13 RX ADMIN — Medication 975 MILLIGRAM(S): at 03:31

## 2019-10-13 RX ADMIN — Medication 1 TABLET(S): at 12:22

## 2019-10-13 RX ADMIN — SODIUM CHLORIDE 3 MILLILITER(S): 9 INJECTION INTRAMUSCULAR; INTRAVENOUS; SUBCUTANEOUS at 06:51

## 2019-10-13 RX ADMIN — Medication 975 MILLIGRAM(S): at 22:20

## 2019-10-13 RX ADMIN — SODIUM CHLORIDE 3 MILLILITER(S): 9 INJECTION INTRAMUSCULAR; INTRAVENOUS; SUBCUTANEOUS at 13:44

## 2019-10-13 RX ADMIN — Medication 600 MILLIGRAM(S): at 12:20

## 2019-10-13 RX ADMIN — Medication 975 MILLIGRAM(S): at 23:02

## 2019-10-13 RX ADMIN — Medication 975 MILLIGRAM(S): at 03:00

## 2019-10-13 RX ADMIN — Medication 600 MILLIGRAM(S): at 18:14

## 2019-10-13 RX ADMIN — Medication 600 MILLIGRAM(S): at 07:00

## 2019-10-13 RX ADMIN — Medication 600 MILLIGRAM(S): at 06:58

## 2019-10-13 RX ADMIN — Medication 975 MILLIGRAM(S): at 16:30

## 2019-10-13 RX ADMIN — Medication 600 MILLIGRAM(S): at 13:00

## 2019-10-13 RX ADMIN — Medication 975 MILLIGRAM(S): at 09:42

## 2019-10-13 RX ADMIN — Medication 1 APPLICATION(S): at 12:22

## 2019-10-13 RX ADMIN — SODIUM CHLORIDE 3 MILLILITER(S): 9 INJECTION INTRAMUSCULAR; INTRAVENOUS; SUBCUTANEOUS at 22:00

## 2019-10-13 RX ADMIN — Medication 975 MILLIGRAM(S): at 10:20

## 2019-10-13 RX ADMIN — Medication 600 MILLIGRAM(S): at 01:00

## 2019-10-13 RX ADMIN — Medication 600 MILLIGRAM(S): at 18:56

## 2019-10-13 NOTE — PROGRESS NOTE ADULT - ASSESSMENT
A/P 34y s/p , PPD #2, s/p magnesium for PEC with SF  - PEC with SF: s/p magnesium, no toxic sx, normotensive, BP q4h  - Pain: well controlled on motrin and tylenol  - GI: Tolerating regular diet, colace PRN  - : tripp in situ, to be removed at 07:00 per patient request  - DVT prophylaxis: ambulate frequently  - Dispo: PPD 2, unless otherwise specified

## 2019-10-13 NOTE — PROVIDER CONTACT NOTE (OTHER) - ACTION/TREATMENT ORDERED:
OB resident Dr. Duque stated that he will see if they should start any anti-hypertensive medications and will get back to me.

## 2019-10-13 NOTE — LACTATION INITIAL EVALUATION - NS LACT CON REASON FOR REQ
Met dyad at ~ 42 hours. Baby born at 38.3/3% weight loss/6 voids/5 stools. Baby was supplemented with formula on delivery day due to maternal medical condition (elevated BP/mag infusion). Mother feeling better, mag dc’d, wishes to exclusively breastfeed. Positioning and latch strategies taught, and baby was able to latch deeply, sucking rhythmically between periods of rest. Breastfeeding basics and normal  behavior reviewed. Encouraged frequent SSC, rooming in, and to breastfeed in response to cues at least 8-12x/day. Supply and demand feedback system for milk production reviewed, and importance of pumping and/or performing hand expression for any missed or ineffective feeds in the future was discussed. Hand expression did not yield any colostrum at this time. Reviewed signs that baby is getting enough. Anticipatory guidance given regarding triple feeding care plan. Support given. Answered all questions for mother and father. Encouraged to call for additional support as needed from RN or LC./primaparous mom

## 2019-10-14 ENCOUNTER — TRANSCRIPTION ENCOUNTER (OUTPATIENT)
Age: 34
End: 2019-10-14

## 2019-10-14 VITALS — DIASTOLIC BLOOD PRESSURE: 79 MMHG | SYSTOLIC BLOOD PRESSURE: 125 MMHG

## 2019-10-14 PROCEDURE — 86850 RBC ANTIBODY SCREEN: CPT

## 2019-10-14 PROCEDURE — 82570 ASSAY OF URINE CREATININE: CPT

## 2019-10-14 PROCEDURE — 86780 TREPONEMA PALLIDUM: CPT

## 2019-10-14 PROCEDURE — 85027 COMPLETE CBC AUTOMATED: CPT

## 2019-10-14 PROCEDURE — 80053 COMPREHEN METABOLIC PANEL: CPT

## 2019-10-14 PROCEDURE — 86901 BLOOD TYPING SEROLOGIC RH(D): CPT

## 2019-10-14 PROCEDURE — 86900 BLOOD TYPING SEROLOGIC ABO: CPT

## 2019-10-14 PROCEDURE — 36415 COLL VENOUS BLD VENIPUNCTURE: CPT

## 2019-10-14 PROCEDURE — 85730 THROMBOPLASTIN TIME PARTIAL: CPT

## 2019-10-14 PROCEDURE — 84156 ASSAY OF PROTEIN URINE: CPT

## 2019-10-14 PROCEDURE — 85610 PROTHROMBIN TIME: CPT

## 2019-10-14 PROCEDURE — 81001 URINALYSIS AUTO W/SCOPE: CPT

## 2019-10-14 PROCEDURE — 85025 COMPLETE CBC W/AUTO DIFF WBC: CPT

## 2019-10-14 PROCEDURE — 99214 OFFICE O/P EST MOD 30 MIN: CPT

## 2019-10-14 PROCEDURE — 88307 TISSUE EXAM BY PATHOLOGIST: CPT

## 2019-10-14 PROCEDURE — 83735 ASSAY OF MAGNESIUM: CPT

## 2019-10-14 PROCEDURE — 83615 LACTATE (LD) (LDH) ENZYME: CPT

## 2019-10-14 PROCEDURE — 84550 ASSAY OF BLOOD/URIC ACID: CPT

## 2019-10-14 PROCEDURE — 85384 FIBRINOGEN ACTIVITY: CPT

## 2019-10-14 RX ADMIN — Medication 1 TABLET(S): at 12:25

## 2019-10-14 RX ADMIN — Medication 600 MILLIGRAM(S): at 00:57

## 2019-10-14 RX ADMIN — Medication 600 MILLIGRAM(S): at 06:24

## 2019-10-14 RX ADMIN — Medication 975 MILLIGRAM(S): at 10:18

## 2019-10-14 RX ADMIN — Medication 975 MILLIGRAM(S): at 04:17

## 2019-10-14 RX ADMIN — Medication 975 MILLIGRAM(S): at 04:18

## 2019-10-14 RX ADMIN — SODIUM CHLORIDE 3 MILLILITER(S): 9 INJECTION INTRAMUSCULAR; INTRAVENOUS; SUBCUTANEOUS at 06:30

## 2019-10-14 RX ADMIN — Medication 100 MILLIGRAM(S): at 06:24

## 2019-10-14 RX ADMIN — Medication 600 MILLIGRAM(S): at 00:35

## 2019-10-14 RX ADMIN — Medication 600 MILLIGRAM(S): at 12:25

## 2019-10-14 RX ADMIN — Medication 600 MILLIGRAM(S): at 06:23

## 2019-10-14 NOTE — DISCHARGE NOTE OB - CARE PROVIDER_API CALL
Alicja Meza)  Obstetrics and Gynecology  [address] [place] [address]  Phone: [phone]  Fax: [phone]  Follow Up Time:

## 2019-10-14 NOTE — PROGRESS NOTE ADULT - ASSESSMENT
A/P 34y s/p , PPD #3, stable, meeting postpartum milestones   - Pain: well controlled on motrin and tylenol  - PEC with SF: s/p magnesium, no toxic sx, BPs in mild range, consider starting antihypertensive  - GI: Tolerating regular diet, colace PRN  - : urinating without difficulty/pain  - DVT prophylaxis: ambulating frequently  - Dispo: PPD 2, unless otherwise specified

## 2019-10-14 NOTE — DISCHARGE NOTE OB - PATIENT PORTAL LINK FT
You can access the FollowMyHealth Patient Portal offered by [facility] by registering at the following website: [email or web address]. By joining [employer]’s FollowMyHealth portal, you will also be able to view your health information using other applications (apps) compatible with our system.

## 2019-10-14 NOTE — DISCHARGE NOTE OB - HOSPITAL COURSE
Patient is status post a vaginal delivery c/b PEC w/SF being LFTs, s/p IV magnesium. LFT improving, patient asymptomatic, no toxic complaints. She has met her postpartum milestones appropriately.  Vitals are stable for discharge.

## 2019-10-14 NOTE — PROGRESS NOTE ADULT - ASSESSMENT
A/P 34y s/p , PPD #2, stable, meeting postpartum milestones   - Pain: well controlled on motrin and tylenol  - GI: Tolerating regular diet, colace PRN  - : urinating without difficulty/pain  - DVT prophylaxis: ambulating frequently  - Dispo: PPD 2, unless otherwise specified

## 2019-10-14 NOTE — PROGRESS NOTE ADULT - SUBJECTIVE AND OBJECTIVE BOX
Patient evaluated at bedside for clinical magnesium check. Serum magnesium was drawn and was found to be 5.9.    She reports blurry vision and inability to focus, especially is the right eye but denies scotomas, headache and right upper quadrant pain. Also denies nausea/vomiting/epigastric pain/shortness of breath. Pain well controlled.     T(C): 36.5 (10-12-19 @ 03:00), Max: 37.3 (10-12-19 @ 00:00)  HR: 84 (10-12-19 @ 03:00) (84 - 112)  BP: 126/86 (10-12-19 @ 03:00) (96/62 - 140/91)  RR: 18 (10-12-19 @ 03:00) (17 - 18)  SpO2: 97% (10-12-19 @ 03:00) (97% - 97%)  Wt(kg): --  Daily Height in cm: 162.56 (11 Oct 2019 05:53)    Daily Weight Pre-pregnancy in k (11 Oct 2019 05:53)    10-11 @ 07:01  -  10-12 @ 05:11  --------------------------------------------------------  IN: 1900 mL / OUT: 3080 mL / NET: -1180 mL      Gen: NAD, AAOx3  CV: RRR, no M/R/G  Pulm: CTAB, no R/R/W  Abd: soft, nontender, no rebound or guarding, no epigastric tenderness, liver nonpalpable +BS, fundus palpated   : Chu in place  Ext: +1 edema marilee, SCDs in place, Reflexes +2                          11.7   12.27 )-----------( 243      ( 11 Oct 2019 06:38 )             34.3     10-11    137  |  104  |  6<L>  ----------------------------<  93  3.7   |  21<L>  |  0.75    Ca    8.4      11 Oct 2019 06:38  Mg     5.9     10-12    TPro  6.5  /  Alb  3.4  /  TBili  0.3  /  DBili  x   /  AST  118<H>  /  ALT  177<H>  /  AlkPhos  320<H>  10-11    acetaminophen   Tablet .. 975 milliGRAM(s) Oral <User Schedule>  benzocaine 20%/menthol 0.5% Spray 1 Spray(s) Topical every 6 hours PRN  dibucaine 1% Ointment 1 Application(s) Topical every 6 hours PRN  diphenhydrAMINE 25 milliGRAM(s) Oral every 6 hours PRN  diphtheria/tetanus/pertussis (acellular) Vaccine (ADAcel) 0.5 milliLiter(s) IntraMuscular once  docusate sodium 100 milliGRAM(s) Oral two times a day PRN  glycerin Suppository - Adult 1 Suppository(s) Rectal at bedtime PRN  hydrocortisone 1% Cream 1 Application(s) Topical every 6 hours PRN  ibuprofen  Tablet. 600 milliGRAM(s) Oral every 6 hours  lactated ringers. 1000 milliLiter(s) IV Continuous <Continuous>  lanolin Ointment 1 Application(s) Topical every 6 hours PRN  magnesium hydroxide Suspension 30 milliLiter(s) Oral two times a day PRN  magnesium sulfate Infusion 1.5 Gm/Hr IV Continuous <Continuous>  oxyCODONE    IR 5 milliGRAM(s) Oral every 3 hours PRN  oxyCODONE    IR 5 milliGRAM(s) Oral once PRN  oxytocin Infusion 333.333 milliUNIT(s)/Min IV Continuous <Continuous>  pramoxine 1%/zinc 5% Cream 1 Application(s) Topical every 4 hours PRN  prenatal multivitamin 1 Tablet(s) Oral daily  simethicone 80 milliGRAM(s) Chew every 4 hours PRN  sodium chloride 0.9% lock flush 3 milliLiter(s) IV Push every 8 hours  witch hazel Pads 1 Application(s) Topical every 4 hours PRN      10-11-19 @ 07:01  -  10-12-19 @ 05:11  --------------------------------------------------------  IN: 1900 mL / OUT: 3080 mL / NET: -1180 mL
Patient evaluated at bedside this morning, resting comfortable in bed, no acute events overnight.  She reports pain is well controlled with tylenol and motrin.  She denies headache, dizziness, chest pain, palpitations, shortness of breath, nausea, vomiting, heavy vaginal bleeding or perineal discomfort. Reports decrease in amount of vaginal bleeding and denies clots.  She has been ambulating without assistance, voiding spontaneously, and is breastfeeding.   Tolerating food well, without nausea/vomit.  Passing flatus.     Physical Exam:  T(C): 36.9 (10-14-19 @ 02:00), Max: 36.9 (10-14-19 @ 02:00)  HR: 72 (10-14-19 @ 02:00) (72 - 72)  BP: 137/76 (10-14-19 @ 02:00) (130/88 - 145/85)  RR: 18 (10-14-19 @ 02:00) (18 - 18)  SpO2: 98% (10-14-19 @ 02:00) (98% - 98%)    GA: NAD, A&O x 3  CV: RRR, no murmurs, rubs, or gallops  Pulm: clear breath sounds throughout, no rales, rhonchi, wheezes  Abd: + BS, soft, nontender, nondistended, no rebound or guarding, uterus firm at midline and below umbilicus  Extremities: no swelling or calf tenderness  Perineum: normal lochia, intact, healing well, no hematoma                          8.1    14.04 )-----------( 228      ( 13 Oct 2019 06:23 )             24.0     10-13    136  |  108  |  7   ----------------------------<  82  3.8   |  22  |  0.83    Ca    6.8<L>      13 Oct 2019 06:23  Mg     5.6     10-12    TPro  4.8<L>  /  Alb  2.2<L>  /  TBili  <0.2  /  DBili  x   /  AST  62<H>  /  ALT  97<H>  /  AlkPhos  198<H>  10-13    acetaminophen   Tablet .. 975 milliGRAM(s) Oral <User Schedule>  benzocaine 20%/menthol 0.5% Spray 1 Spray(s) Topical every 6 hours PRN  dibucaine 1% Ointment 1 Application(s) Topical every 6 hours PRN  diphenhydrAMINE 25 milliGRAM(s) Oral every 6 hours PRN  diphtheria/tetanus/pertussis (acellular) Vaccine (ADAcel) 0.5 milliLiter(s) IntraMuscular once  docusate sodium 100 milliGRAM(s) Oral two times a day PRN  glycerin Suppository - Adult 1 Suppository(s) Rectal at bedtime PRN  hydrocortisone 1% Cream 1 Application(s) Topical every 6 hours PRN  ibuprofen  Tablet. 600 milliGRAM(s) Oral every 6 hours  lanolin Ointment 1 Application(s) Topical every 6 hours PRN  magnesium hydroxide Suspension 30 milliLiter(s) Oral two times a day PRN  oxyCODONE    IR 5 milliGRAM(s) Oral every 3 hours PRN  oxyCODONE    IR 5 milliGRAM(s) Oral once PRN  oxytocin Infusion 333.333 milliUNIT(s)/Min IV Continuous <Continuous>  pramoxine 1%/zinc 5% Cream 1 Application(s) Topical every 4 hours PRN  prenatal multivitamin 1 Tablet(s) Oral daily  simethicone 80 milliGRAM(s) Chew every 4 hours PRN  sodium chloride 0.9% lock flush 3 milliLiter(s) IV Push every 8 hours  witch hazel Pads 1 Application(s) Topical every 4 hours PRN
Patient evaluated at bedside for clinical magnesium check.     She denies visual disturbances including scotoma, headache and right upper quadrant pain. Also denies nausea/vomiting/epigastric pain/shortness of breath. Pain well controlled.      T(C): 36.6 (10-12-19 @ 19:00), Max: 36.6 (10-12-19 @ 17:00)  HR: 68 (10-12-19 @ 19:00) (61 - 68)  BP: 132/78 (10-12-19 @ 19:00) (114/68 - 132/78)  RR: 18 (10-12-19 @ 19:00) (18 - 19)  SpO2: 98% (10-12-19 @ 19:00) (97% - 98%)  Wt(kg): --    Gen: NAD  Pulm: CTAB  Abd: soft, nontender, no rebound or guarding, no epigastric tenderness, liver nonpalpable +BS, fundus palpated   : Chu in place  Ext: Reflexes +                          8.6    21.30 )-----------( 218      ( 12 Oct 2019 10:06 )             24.7     10-12    132<L>  |  102  |  7   ----------------------------<  112<H>  3.4<L>   |  21<L>  |  0.85    Ca    6.9<L>      12 Oct 2019 10:06  Mg     5.6     10-12    TPro  4.7<L>  /  Alb  2.2<L>  /  TBili  0.2  /  DBili  x   /  AST  94<H>  /  ALT  131<H>  /  AlkPhos  216<H>  10-12        10-11-19 @ 07:01  -  10-12-19 @ 07:00  --------------------------------------------------------  IN: 2350 mL / OUT: 3680 mL / NET: -1330 mL    10-12-19 @ 07:01  -  10-12-19 @ 21:01  --------------------------------------------------------  IN: 900 mL / OUT: 2420 mL / NET: -1520 mL
Patient evaluated at bedside for clinical magnesium check. Endorses feeling groggy and fatigued. Having trouble getting sleep. But denies any toxic symptoms such as visual disturbances including scotoma, headache and right upper quadrant pain. Also denies nausea/vomiting/epigastric pain/shortness of breath. Pain well controlled.      T(C): 36.4 (10-12-19 @ 11:00), Max: 37.3 (10-12-19 @ 00:00)  HR: 61 (10-12-19 @ 11:00) (61 - 112)  BP: 118/75 (10-12-19 @ 11:00) (96/62 - 140/91)  RR: 18 (10-12-19 @ 11:00) (17 - 18)  SpO2: 97% (10-12-19 @ 11:00) (97% - 98%)  Wt(kg): --    Gen: NAD  Pulm: CTAB no w/r/r  Abd: soft, nontender, no rebound or guarding, no epigastric tenderness, liver nonpalpable +BS, fundus palpated   : Chu in place  Ext: Reflexes 2+ b/l patellar and brachioradialis                          8.6    21.30 )-----------( 218      ( 12 Oct 2019 10:06 )             24.7     10-12    132<L>  |  102  |  7   ----------------------------<  112<H>  3.4<L>   |  21<L>  |  0.85    Ca    6.9<L>      12 Oct 2019 10:06  Mg     6.2     10-12    TPro  4.7<L>  /  Alb  2.2<L>  /  TBili  0.2  /  DBili  x   /  AST  94<H>  /  ALT  131<H>  /  AlkPhos  216<H>  10-12        10-11-19 @ 07:01  -  10-12-19 @ 07:00  --------------------------------------------------------  IN: 2350 mL / OUT: 3680 mL / NET: -1330 mL    10-12-19 @ 07:01  -  10-12-19 @ 11:40  --------------------------------------------------------  IN: 375 mL / OUT: 460 mL / NET: -85 mL
Patient evaluated at bedside for clinical magnesium check. She endorses mild 1/10 headache over R eye that began 1 hr ago.   She denies visual disturbances including scotoma and right upper quadrant pain. Also denies nausea/vomiting/epigastric pain/shortness of breath. Pain well controlled.      T(C): 36.3 (10-12-19 @ 15:00), Max: 36.4 (10-12-19 @ 05:00)  HR: 64 (10-12-19 @ 15:00) (61 - 73)  BP: 131/86 (10-12-19 @ 15:00) (114/68 - 135/81)  RR: 18 (10-12-19 @ 15:00) (17 - 18)  SpO2: 98% (10-12-19 @ 15:00) (97% - 98%)  Wt(kg): --    Gen: NAD  Pulm: CTAB mp w/r/r  Abd: soft, nontender, no rebound or guarding, no epigastric tenderness, liver nonpalpable +BS, fundus palpated   : Chu in place  Ext: Reflexes 2+ b/l patellar and brachioradialis                          8.6    21.30 )-----------( 218      ( 12 Oct 2019 10:06 )             24.7     10-12    132<L>  |  102  |  7   ----------------------------<  112<H>  3.4<L>   |  21<L>  |  0.85    Ca    6.9<L>      12 Oct 2019 10:06  Mg     6.2     10-12    TPro  4.7<L>  /  Alb  2.2<L>  /  TBili  0.2  /  DBili  x   /  AST  94<H>  /  ALT  131<H>  /  AlkPhos  216<H>  10-12        10-11-19 @ 07:01  -  10-12-19 @ 07:00  --------------------------------------------------------  IN: 2350 mL / OUT: 3680 mL / NET: -1330 mL    10-12-19 @ 07:01  -  10-12-19 @ 15:41  --------------------------------------------------------  IN: 750 mL / OUT: 1320 mL / NET: -570 mL
Patient evaluated at bedside this morning, resting comfortable in bed, no acute events overnight.  She reports pain is well controlled with tylenol and motrin.  She denies headache, dizziness, chest pain, palpitations, shortness of breath, nausea, vomiting, heavy vaginal bleeding or perineal discomfort. Reports decrease in amount of vaginal bleeding and denies clots.  She has been ambulating without assistance, voiding spontaneously, and is breastfeeding.   Tolerating food well, without nausea/vomit.  Passing flatus.     Physical Exam:  T(C): 36.2 (10-13-19 @ 01:55), Max: 36.6 (10-12-19 @ 19:00)  HR: 61 (10-13-19 @ 01:55) (59 - 69)  BP: 120/75 (10-13-19 @ 01:55) (120/75 - 132/78)  RR: 18 (10-13-19 @ 01:55) (18 - 19)  SpO2: 99% (10-13-19 @ 01:55) (98% - 100%)    GA: NAD, A&O x 3  CV: RRR, no murmurs, rubs, or gallops  Pulm: clear breath sounds throughout, no rales, rhonchi, wheezes  Abd: + BS, soft, nontender, nondistended, no rebound or guarding, uterus firm at midline and below umbilicus  Extremities: no swelling or calf tenderness  Perineum: normal lochia, intact, healing well, no hematoma, tripp in place                          8.6    21.30 )-----------( 218      ( 12 Oct 2019 10:06 )             24.7     10-12    132<L>  |  102  |  7   ----------------------------<  112<H>  3.4<L>   |  21<L>  |  0.85    Ca    6.9<L>      12 Oct 2019 10:06  Mg     5.6     10-12    TPro  4.7<L>  /  Alb  2.2<L>  /  TBili  0.2  /  DBili  x   /  AST  94<H>  /  ALT  131<H>  /  AlkPhos  216<H>  10-12    acetaminophen   Tablet .. 975 milliGRAM(s) Oral <User Schedule>  benzocaine 20%/menthol 0.5% Spray 1 Spray(s) Topical every 6 hours PRN  dibucaine 1% Ointment 1 Application(s) Topical every 6 hours PRN  diphenhydrAMINE 25 milliGRAM(s) Oral every 6 hours PRN  diphtheria/tetanus/pertussis (acellular) Vaccine (ADAcel) 0.5 milliLiter(s) IntraMuscular once  docusate sodium 100 milliGRAM(s) Oral two times a day PRN  glycerin Suppository - Adult 1 Suppository(s) Rectal at bedtime PRN  hydrocortisone 1% Cream 1 Application(s) Topical every 6 hours PRN  ibuprofen  Tablet. 600 milliGRAM(s) Oral every 6 hours  lanolin Ointment 1 Application(s) Topical every 6 hours PRN  magnesium hydroxide Suspension 30 milliLiter(s) Oral two times a day PRN  oxyCODONE    IR 5 milliGRAM(s) Oral every 3 hours PRN  oxyCODONE    IR 5 milliGRAM(s) Oral once PRN  oxytocin Infusion 333.333 milliUNIT(s)/Min IV Continuous <Continuous>  pramoxine 1%/zinc 5% Cream 1 Application(s) Topical every 4 hours PRN  prenatal multivitamin 1 Tablet(s) Oral daily  simethicone 80 milliGRAM(s) Chew every 4 hours PRN  sodium chloride 0.9% lock flush 3 milliLiter(s) IV Push every 8 hours  witch hazel Pads 1 Application(s) Topical every 4 hours PRN
Patient evaluated at bedside this morning, resting comfortable in bed, no acute events overnight.  She reports pain is well controlled with tylenol and motrin.  She denies headache, dizziness, chest pain, palpitations, shortness of breath, nausea, vomiting, heavy vaginal bleeding or perineal discomfort. Reports decrease in amount of vaginal bleeding and denies clots.  She has been ambulating without assistance, voiding spontaneously, and is breastfeeding.   Tolerating food well, without nausea/vomit.  Passing flatus.     Physical Exam:  T(C): 36.9 (10-14-19 @ 02:00), Max: 36.9 (10-14-19 @ 02:00)  HR: 72 (10-14-19 @ 02:00) (72 - 72)  BP: 137/76 (10-14-19 @ 02:00) (130/88 - 145/85)  RR: 18 (10-14-19 @ 02:00) (18 - 18)  SpO2: 98% (10-14-19 @ 02:00) (98% - 98%)    GA: NAD, A&O x 3  CV: RRR, no murmurs, rubs, or gallops  Pulm: clear breath sounds throughout, no rales, rhonchi, wheezes  Abd: + BS, soft, nontender, nondistended, no rebound or guarding, uterus firm at midline and below umbilicus  Extremities: no swelling or calf tenderness  Perineum: normal lochia, intact, healing well, no hematoma                          8.1    14.04 )-----------( 228      ( 13 Oct 2019 06:23 )             24.0     10-13    136  |  108  |  7   ----------------------------<  82  3.8   |  22  |  0.83    Ca    6.8<L>      13 Oct 2019 06:23  Mg     5.6     10-12    TPro  4.8<L>  /  Alb  2.2<L>  /  TBili  <0.2  /  DBili  x   /  AST  62<H>  /  ALT  97<H>  /  AlkPhos  198<H>  10-13    acetaminophen   Tablet .. 975 milliGRAM(s) Oral <User Schedule>  benzocaine 20%/menthol 0.5% Spray 1 Spray(s) Topical every 6 hours PRN  dibucaine 1% Ointment 1 Application(s) Topical every 6 hours PRN  diphenhydrAMINE 25 milliGRAM(s) Oral every 6 hours PRN  diphtheria/tetanus/pertussis (acellular) Vaccine (ADAcel) 0.5 milliLiter(s) IntraMuscular once  docusate sodium 100 milliGRAM(s) Oral two times a day PRN  glycerin Suppository - Adult 1 Suppository(s) Rectal at bedtime PRN  hydrocortisone 1% Cream 1 Application(s) Topical every 6 hours PRN  ibuprofen  Tablet. 600 milliGRAM(s) Oral every 6 hours  lanolin Ointment 1 Application(s) Topical every 6 hours PRN  magnesium hydroxide Suspension 30 milliLiter(s) Oral two times a day PRN  oxyCODONE    IR 5 milliGRAM(s) Oral every 3 hours PRN  oxyCODONE    IR 5 milliGRAM(s) Oral once PRN  oxytocin Infusion 333.333 milliUNIT(s)/Min IV Continuous <Continuous>  pramoxine 1%/zinc 5% Cream 1 Application(s) Topical every 4 hours PRN  prenatal multivitamin 1 Tablet(s) Oral daily  simethicone 80 milliGRAM(s) Chew every 4 hours PRN  sodium chloride 0.9% lock flush 3 milliLiter(s) IV Push every 8 hours  witch hazel Pads 1 Application(s) Topical every 4 hours PRN

## 2019-10-14 NOTE — DISCHARGE NOTE OB - MATERIALS PROVIDED
Immunization Record/Breastfeeding Log/Shaken Baby Prevention Handout/Tdap Vaccination (VIS Pub Date: 2012)/MMR Vaccination (VIS Pub Date: 2012)/Vaccinations/Breastfeeding Mother’s Support Group Information/Guide to Postpartum Care/[facility] Hearing Screen Program/[facility]  Screening Program/Back To Sleep Handout

## 2019-10-14 NOTE — DISCHARGE NOTE OB - PLAN OF CARE
discharge home Take Motrin 600mg every 6 hours and/or tylenol 650mg every 6 hours as needed for pain. Call your OB to schedule a follow up appointment in 1 week for BP check. Nothing per vagina until cleared by your OB - no intercourse, douching, tampons, etc.  Call your OB if you experience severe abdominal pain not improved by oral pain medications, heavy bright red vaginal bleeding saturating more than 1 pad per hour, or fever greater than 100.4F. Consider contraception options to be discussed with your OB. Blood pressure control Follow up with your OB in one week for a Bloodpressure check.  Purchase electronic blood pressure cuff at your local pharmacy. Check blood pressure 3x a day. If bp >160/110, you develop a headache not relieved by tylenol, visual disturbances, or right upper abdominal pain, call your doctor or the hospital, or go to your nearest emergency room. Regular diet, normal activity, nothing in the vagina for 6 weeks--no sex, tampons, tub baths, or swimming pools.

## 2019-10-14 NOTE — DISCHARGE NOTE OB - CARE PLAN
Principal Discharge DX:	Postpartum state  Goal:	discharge home  Assessment and plan of treatment:	Take Motrin 600mg every 6 hours and/or tylenol 650mg every 6 hours as needed for pain. Call your OB to schedule a follow up appointment in 1 week for BP check. Nothing per vagina until cleared by your OB - no intercourse, douching, tampons, etc.  Call your OB if you experience severe abdominal pain not improved by oral pain medications, heavy bright red vaginal bleeding saturating more than 1 pad per hour, or fever greater than 100.4F. Consider contraception options to be discussed with your OB.  Goal:	Blood pressure control  Assessment and plan of treatment:	Follow up with your OB in one week for a Bloodpressure check.  Purchase electronic blood pressure cuff at your local pharmacy. Check blood pressure 3x a day. If bp >160/110, you develop a headache not relieved by tylenol, visual disturbances, or right upper abdominal pain, call your doctor or the hospital, or go to your nearest emergency room. Regular diet, normal activity, nothing in the vagina for 6 weeks--no sex, tampons, tub baths, or swimming pools.

## 2019-10-18 DIAGNOSIS — Z3A.38 38 WEEKS GESTATION OF PREGNANCY: ICD-10-CM

## 2019-10-23 LAB — SURGICAL PATHOLOGY STUDY: SIGNIFICANT CHANGE UP

## 2019-10-29 PROBLEM — Z00.00 ENCOUNTER FOR PREVENTIVE HEALTH EXAMINATION: Status: ACTIVE | Noted: 2019-10-29

## 2019-11-12 ENCOUNTER — APPOINTMENT (OUTPATIENT)
Dept: NEPHROLOGY | Facility: CLINIC | Age: 34
End: 2019-11-12
Payer: COMMERCIAL

## 2019-11-12 VITALS — HEART RATE: 68 BPM | DIASTOLIC BLOOD PRESSURE: 70 MMHG | SYSTOLIC BLOOD PRESSURE: 104 MMHG

## 2019-11-12 VITALS — DIASTOLIC BLOOD PRESSURE: 80 MMHG | HEART RATE: 66 BPM | SYSTOLIC BLOOD PRESSURE: 108 MMHG

## 2019-11-12 DIAGNOSIS — Z78.9 OTHER SPECIFIED HEALTH STATUS: ICD-10-CM

## 2019-11-12 DIAGNOSIS — Z82.49 FAMILY HISTORY OF ISCHEMIC HEART DISEASE AND OTHER DISEASES OF THE CIRCULATORY SYSTEM: ICD-10-CM

## 2019-11-12 DIAGNOSIS — I10 ESSENTIAL (PRIMARY) HYPERTENSION: ICD-10-CM

## 2019-11-12 PROCEDURE — 99244 OFF/OP CNSLTJ NEW/EST MOD 40: CPT

## 2019-11-12 RX ORDER — NIFEDIPINE 30 MG/1
30 TABLET, EXTENDED RELEASE ORAL
Refills: 0 | Status: ACTIVE | COMMUNITY

## 2019-11-12 NOTE — HISTORY OF PRESENT ILLNESS
[FreeTextEntry1] : 35 yo woman here for further evaluation and management of HTN. \par developed HTN when was being admitted to [facility]- data c/w pre eclampsia- put on magnesium-- did not need other BP meds- delivered 10/11-- \par had been 136/90 mm Hg range few days prior to delivery- \par was starting to dilate then had her membrane swiped and went into labor\par after dc home  ( 1 week later)  passed a huge clot, or retained placenta- went to ER- BP was in 160/90 range- admitted to [facility]  put on magnesium and also started on nifedipine 30 mg daily- BPs improved.\par Over past week, BPs started to improved- pt spoke with Dr. Meza- is holding nifedipine for BP < 120/70- last took medication on 10/29\par feels okay today

## 2019-11-12 NOTE — CONSULT LETTER
[Dear  ___] : Dear ~MAHSA, [Consult Letter:] : I had the pleasure of evaluating your patient, [unfilled]. [Please see my note below.] : Please see my note below. [Consult Closing:] : Thank you very much for allowing me to participate in the care of this patient.  If you have any questions, please do not hesitate to contact me. [FreeTextEntry2] : Alicja Meza MD\par [address] [place] [address]\par [place] [id number] [FreeTextEntry1] : Her blood pressure today was 108/80 and her exam was without focal findings. I agreed with your plan to hold nifedipine if BP within limits. I will keep you apprised of my findings.  [FreeTextEntry3] : .bst\par \par Georgiana Hayward MD, FACP\par Professor of Medicine\par [facility] [employer] [facility]\par \par

## 2019-11-12 NOTE — ASSESSMENT
[FreeTextEntry1] : 35 yo woman with pre eclampsia and persistent elevation of BP after dc from hospital\par BP now improving.\par Agree with holding nifedipine -  can hold for BP < = 125/85\par c/w home BP monitoring-\par Avoid NSAIDS (she is not using any at this time\par kep me appried of  BP readings  > 140/90\par f/u  6 weeks or as needed

## 2019-11-12 NOTE — PHYSICAL EXAM
[General Appearance - Alert] : alert [General Appearance - In No Acute Distress] : in no acute distress [Sclera] : the sclera and conjunctiva were normal [Neck Appearance] : the appearance of the neck was normal [Extraocular Movements] : extraocular movements were intact [Outer Ear] : the ears and nose were normal in appearance [Neck Cervical Mass (___cm)] : no neck mass was observed [Jugular Venous Distention Increased] : there was no jugular-venous distention [Heart Rate And Rhythm] : heart rate was normal and rhythm regular [Heart Sounds] : normal S1 and S2 [Auscultation Breath Sounds / Voice Sounds] : lungs were clear to auscultation bilaterally [Murmurs] : no murmurs [Heart Sounds Gallop] : no gallops [Edema] : there was no peripheral edema [Heart Sounds Pericardial Friction Rub] : no pericardial rub [Abdomen Soft] : soft [Abdomen Tenderness] : non-tender [Abdomen Mass (___ Cm)] : no abdominal mass palpated [Cervical Lymph Nodes Enlarged Posterior Bilaterally] : posterior cervical [Cervical Lymph Nodes Enlarged Anterior Bilaterally] : anterior cervical [Supraclavicular Lymph Nodes Enlarged Bilaterally] : supraclavicular [No CVA Tenderness] : no ~M costovertebral angle tenderness [No Spinal Tenderness] : no spinal tenderness [Abnormal Walk] : normal gait [] : no rash [No Focal Deficits] : no focal deficits [Oriented To Time, Place, And Person] : oriented to person, place, and time [Impaired Insight] : insight and judgment were intact [Affect] : the affect was normal

## 2021-02-09 NOTE — ED CDU PROVIDER DISPOSITION NOTE - NS_OBSORDERDATE_ED_A_ED
[Fall prevention counseling provided] : Fall prevention counseling provided [Adequate lighting] : Adequate lighting [No throw rugs] : No throw rugs [Use proper foot wear] : Use proper foot wear [Behavioral health counseling provided] : Behavioral health counseling provided [Sleep ___ hours/day] : Sleep [unfilled] hours/day [Engage in a relaxing activity] : Engage in a relaxing activity [Plan in advance] : Plan in advance 26-Jun-2018 14:21 [AUDIT-C Screening administered and reviewed] : AUDIT-C Screening administered and reviewed [None] : None [Good understanding] : Patient has a good understanding of lifestyle changes and steps needed to achieve self management goal

## 2021-05-28 NOTE — LACTATION INITIAL EVALUATION - LATCH: SCORE INFANT
Labor Analgesia  Performed by: Courtney Cruz MD  Authorized by: Courtney Cruz MD       General Information and Staff    Start Time:  5/28/2021 3:10 PM  Anesthesiologist:  Courtney Cruz MD  Performed by:   Anesthesiologist  Patient Lo 8

## 2022-02-09 NOTE — ED PROVIDER NOTE - CHIEF COMPLAINT
The patient is a 32y Female complaining of [FreeTextEntry1] : doing well on qvar, cont\par cont crestor, will repeat lipid panel in march The patient is a 32y Female complaining of syncope.

## 2023-05-11 NOTE — ED ADULT NURSE NOTE - NS ED NURSE RECORD ANOTHER VITAL SIGN
chest wall non-tender, breathing is unlabored without accessory muscle use, normal breath sounds Yes

## 2023-08-25 NOTE — ED ADULT TRIAGE NOTE - WEIGHT IN LBS
Vaccine double check completed on vaccine order for TD/ Tenivac    Independently verified:  1. Ordered vaccine is appropriate for patient age: Yes  2. Vaccine order matches syringe/vial that has been prepared: Yes  3. Vaccine interval is correct via standard adult/pediatric vaccine schedule/WIR/Epic: Yes  4. Volume in syringe is correct compared to order: Yes  5. Has vaccine been properly diluted: NA  6. Vaccine is : No  7. Appropriate needle size for patient age/weight/location is being utilized: Yes  8. For pediatrics; does patient qualify for VFC: NA  Vaccine(s) approved to be administered. TD/ Tenivac   139.9

## 2024-12-03 NOTE — ED PROVIDER NOTE - CONSTITUTIONAL NEGATIVE STATEMENT, MLM
12/03/24 0236 12/03/24 0355   Treatment   Treatment Type SCUF SCUF   Treatment Status Clotting;Blood returned;Blood lost Restart   Dialysis Machine Number  --  [id number]   Dialyzer Time (hours) 8.45 0   BVP (Liters) 66.3 L 0 L   Solutions Labeled and Current   --  Yes   Access  --  Temporary Cath;Right;IJ   Catheter Dressing Intact   --  Yes   Alarms Engaged  --  Yes   CRRT Comments Venous chamber and dialyzer clotted; unable to return venous side SCUF restarted        no fever and no chills.